# Patient Record
(demographics unavailable — no encounter records)

---

## 2025-03-18 NOTE — HISTORY OF PRESENT ILLNESS
[de-identified] : 70M here for initial evaluation   Pt c/o decreased hearing for the past 2 years. He states this began after 1 day of sudden hearing loss in 2023. He states he did exercises he saw on youtube and his hearing returned the next morning. However, since then his hearing is not what it used to be. Currently he states his hearing is muffled and there is pressure in his ears. 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 No otorrhea, otalgia, or vertigo. Occasional tinnitus. Last audiogram was in 2015.   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

## 2025-03-18 NOTE — ASSESSMENT
[FreeTextEntry1] : 70M w decreased hearing, right>left, for the past 2 years.  Audiogram shows mostly symmetric thresholds, but asymmetric discrimination scores. Exam is unremarkable. -Given asymmetry, will get MRI. -Also recommended hearing aids die to his age related SNHL and also noise-induced damage as well.

## 2025-03-18 NOTE — PHYSICAL EXAM
[Midline] : trachea located in midline position [Normal] : no rashes [] : septum deviated bilaterally